# Patient Record
(demographics unavailable — no encounter records)

---

## 2025-01-16 NOTE — CONSULT LETTER
[FreeTextEntry2] : I personally saw and examined Malcolm Myers in detail.I have made changes in changes in the body of the note where appropriate. I personally reviewed the HPI, PMH, FH, SH, ROS and medications/allergies. I have spoken to MAHOGANY Denson regarding the history and have personally determined the assessment and plan of care and documented this myself.. I performed all procedures and performed the physical exam. I have made changes in the body of the note where appropriate.   Attesting Faculty: See Attending Signature Below

## 2025-01-16 NOTE — REASON FOR VISIT
[Initial Evaluation] : an initial evaluation for [Hearing Loss] : hearing loss [Spouse] : spouse [FreeTextEntry2] : rupture in both ears

## 2025-01-16 NOTE — ASSESSMENT
[FreeTextEntry1] : Bilat Waxs and blott clot in ears Rx-Cortisporin drops Wax partially removed from Rt aer and hearing improved Unable to fully debride 2/2 pain f/u 7-10 days for further wax removal and hearing test

## 2025-01-16 NOTE — PHYSICAL EXAM
[Midline] : trachea located in midline position [Normal] : no rashes [de-identified] : Bilat hard wax and blood [de-identified] : not visualized

## 2025-01-16 NOTE — HISTORY OF PRESENT ILLNESS
[de-identified] : 68 yo Recently Flew w/ URI and the developed ear fullness and bloody otorrhea bilat No current nasal congestion no other modifying factors no other nasal or throat complaints